# Patient Record
Sex: MALE | Race: WHITE | NOT HISPANIC OR LATINO | URBAN - METROPOLITAN AREA
[De-identification: names, ages, dates, MRNs, and addresses within clinical notes are randomized per-mention and may not be internally consistent; named-entity substitution may affect disease eponyms.]

---
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
## 2018-01-12 NOTE — MISCELLANEOUS
Message  Return to work or school:   Shilpi Starkey is under my professional care   He was seen in my office on 10/04/2017     He is able to return to school on 10/05/2017     Carlos Maier MD       Signatures   Electronically signed by : VIBHA Bowman ; Oct  9 2017 10:40AM EST

## 2018-01-13 VITALS
SYSTOLIC BLOOD PRESSURE: 116 MMHG | HEART RATE: 88 BPM | BODY MASS INDEX: 19.7 KG/M2 | DIASTOLIC BLOOD PRESSURE: 68 MMHG | HEIGHT: 68 IN | WEIGHT: 130 LBS

## 2018-01-14 VITALS
RESPIRATION RATE: 18 BRPM | DIASTOLIC BLOOD PRESSURE: 60 MMHG | HEIGHT: 68 IN | OXYGEN SATURATION: 100 % | BODY MASS INDEX: 18.49 KG/M2 | SYSTOLIC BLOOD PRESSURE: 108 MMHG | WEIGHT: 122 LBS | HEART RATE: 97 BPM | TEMPERATURE: 98.3 F

## 2018-01-15 VITALS
HEIGHT: 68 IN | DIASTOLIC BLOOD PRESSURE: 70 MMHG | HEART RATE: 64 BPM | SYSTOLIC BLOOD PRESSURE: 118 MMHG | BODY MASS INDEX: 18.64 KG/M2 | WEIGHT: 123 LBS

## 2018-01-15 VITALS
HEIGHT: 68 IN | SYSTOLIC BLOOD PRESSURE: 104 MMHG | HEART RATE: 90 BPM | TEMPERATURE: 98.4 F | DIASTOLIC BLOOD PRESSURE: 70 MMHG | OXYGEN SATURATION: 97 % | WEIGHT: 119 LBS | RESPIRATION RATE: 16 BRPM | BODY MASS INDEX: 18.04 KG/M2

## 2018-04-18 ENCOUNTER — OFFICE VISIT (OUTPATIENT)
Dept: FAMILY MEDICINE CLINIC | Facility: CLINIC | Age: 18
End: 2018-04-18
Payer: COMMERCIAL

## 2018-04-18 VITALS
HEIGHT: 67 IN | DIASTOLIC BLOOD PRESSURE: 66 MMHG | BODY MASS INDEX: 19.62 KG/M2 | RESPIRATION RATE: 16 BRPM | TEMPERATURE: 98.2 F | SYSTOLIC BLOOD PRESSURE: 118 MMHG | WEIGHT: 125 LBS

## 2018-04-18 DIAGNOSIS — K40.90 RIGHT INGUINAL HERNIA: ICD-10-CM

## 2018-04-18 DIAGNOSIS — N50.811 TESTICULAR PAIN, RIGHT: Primary | ICD-10-CM

## 2018-04-18 DIAGNOSIS — Q55.22 RETRACTIBLE TESTIS: ICD-10-CM

## 2018-04-18 PROCEDURE — 99213 OFFICE O/P EST LOW 20 MIN: CPT | Performed by: FAMILY MEDICINE

## 2018-04-18 NOTE — PATIENT INSTRUCTIONS
WARM BATHS AND WARM COMPRESSES  ADVIL PRN PAIN  IF PAIN RETURNS AND IS WORSE, GO IMMEDIATELY TO THE ER    UROLOGIC CONSULT

## 2018-04-18 NOTE — PROGRESS NOTES
Assessment/Plan:    Problem List Items Addressed This Visit     Retractible testis    Right inguinal hernia    Testicular pain, right - Primary          Patient Instructions   WARM BATHS AND WARM COMPRESSES  ADVIL PRN PAIN  IF PAIN RETURNS AND IS WORSE, GO IMMEDIATELY TO THE ER    UROLOGIC CONSULT      Return in about 4 weeks (around 5/16/2018), or if symptoms worsen or fail to improve  Subjective:      Patient ID: Isai Khan is a 19 y o  male  Chief Complaint   Patient presents with    Testicle Injury     ascended testicle       PATIENT RETURNS  CONTINUES TO EXPERIENCE TESTICULAR RETRACTION  LAST 2 WEEKS  INCREASED DISCOMFORT ON R SIDE  DENIES ANY NVD  NO HEMATURIA  TESTIS IS ABLE TO BE REDUCED    DISCUSSED UROLOGY VISITS  REVIEWED OPTIONS        The following portions of the patient's history were reviewed and updated as appropriate: allergies, current medications, past family history, past medical history, past social history, past surgical history and problem list     Review of Systems   Constitutional: Negative for chills, fatigue and fever  HENT: Negative for sore throat  Eyes: Negative for discharge  Respiratory: Negative for cough and chest tightness  Cardiovascular: Negative for chest pain and palpitations  Gastrointestinal: Negative for abdominal pain, diarrhea, nausea and vomiting  Genitourinary: Positive for testicular pain  Negative for decreased urine volume, discharge, dysuria, genital sores, penile pain, penile swelling and scrotal swelling  Musculoskeletal: Negative for arthralgias and gait problem  Neurological: Negative for dizziness, weakness and headaches  Hematological: Negative for adenopathy  Psychiatric/Behavioral: The patient is not nervous/anxious  No current outpatient prescriptions on file  No current facility-administered medications for this visit          Objective:    /66 (BP Location: Right arm, Patient Position: Sitting, Cuff Size: Large)   Temp 98 2 °F (36 8 °C) (Temporal)   Resp 16   Ht 5' 7" (1 702 m)   Wt 56 7 kg (125 lb)   BMI 19 58 kg/m²        Physical Exam   Constitutional: He is oriented to person, place, and time  He appears well-developed and well-nourished  HENT:   Head: Normocephalic and atraumatic  Eyes: Conjunctivae and EOM are normal  Pupils are equal, round, and reactive to light  Right eye exhibits no discharge  Left eye exhibits no discharge  Neck: Neck supple  No JVD present  No thyromegaly present  Cardiovascular: Normal rate, regular rhythm and normal heart sounds  No murmur heard  Pulmonary/Chest: Effort normal and breath sounds normal  He has no wheezes  He has no rales  Abdominal: Soft  Bowel sounds are normal  He exhibits no mass  There is no hepatosplenomegaly  There is no tenderness  There is no rebound, no guarding and no CVA tenderness  Genitourinary: Penis normal  No penile tenderness  Genitourinary Comments: R TESTIS RETREACTED  REDUCED BUT MILDLY TENDER  CREMASTERIC REFLEX PRESENT  NO ERYTHEMA   Musculoskeletal: Normal range of motion  He exhibits no edema, tenderness or deformity  Lymphadenopathy:     He has no cervical adenopathy  He has no axillary adenopathy  Neurological: He is alert and oriented to person, place, and time  Skin: Skin is warm and dry  No rash noted  No erythema  Psychiatric: He has a normal mood and affect   His behavior is normal  Judgment and thought content normal               Camila Francisco MD

## 2018-04-23 ENCOUNTER — TELEPHONE (OUTPATIENT)
Dept: UROLOGY | Facility: AMBULATORY SURGERY CENTER | Age: 18
End: 2018-04-23

## 2018-04-23 NOTE — TELEPHONE ENCOUNTER
Received phone call from patient's mother, Feliciakeyur Bradley, requesting an appointment for patient  Per mother, patient has undescended testicle and has been experiencing pain off and on  Currently patient has no pain, but would like to be seen  Per Felicia Bradley, patient would like to see Dr Julieta Workman only  OV scheduled for 4/30/18 with Dr Julieta Workman in [facility]

## 2018-04-26 ENCOUNTER — TELEPHONE (OUTPATIENT)
Dept: FAMILY MEDICINE CLINIC | Facility: CLINIC | Age: 18
End: 2018-04-26

## 2018-04-26 NOTE — TELEPHONE ENCOUNTER
URI CALLED AND SAID THAT MIKCIE IS GOING TO SEE DR ELISE POWELL AT [facility] ON Monday THE 30TH   HIS NUMBER IS [id number]

## 2018-04-30 ENCOUNTER — OFFICE VISIT (OUTPATIENT)
Dept: UROLOGY | Facility: AMBULATORY SURGERY CENTER | Age: 18
End: 2018-04-30
Payer: COMMERCIAL

## 2018-04-30 VITALS — BODY MASS INDEX: 19.15 KG/M2 | WEIGHT: 122 LBS | HEIGHT: 67 IN

## 2018-04-30 DIAGNOSIS — Q55.22 RETRACTIBLE TESTIS: Primary | ICD-10-CM

## 2018-04-30 PROCEDURE — 99214 OFFICE O/P EST MOD 30 MIN: CPT | Performed by: UROLOGY

## 2018-04-30 NOTE — LETTER
April 30, 2018     Anastacio Schmidt MD  [address] [address]    Patient: Landen Moses   YOB: 2000   Date of Visit: 4/30/2018       Dear Dr Morro Rodriguez: Thank you for referring Landen Moses to me for evaluation  Below are my notes for this consultation  If you have questions, please do not hesitate to call me  I look forward to following your patient along with you  Sincerely,        Bud Vázquez MD        CC: DO Bud Rodríguez MD  4/30/2018  1:14 PM  Sign at close encounter  4/30/2018    Landen Moses  2000  [id number]        Assessment  Retractile right testis with chronic pain, symptoms are worse and persistent    Plan  We discussed his symptoms  Options include management with oral analgesics, support, and rest   This has not worked for him and he is not interested in being on long-term analgesics  We also discussed the possibility of attempted release of the cord, although this is less likely to be a long-term solution for him  Additionally discussed orchiectomy  Patient is interested in this and this is his request   We discussed that although his testis is atrophic, it is likely somewhat functional and there may be loss of testosterone production and semen production  He is comfortable with this risk  He is very frustrated would like the testicle removed  He is also concerned about possible hernia  I will refer him for general surgery evaluation prior to proceeding with orchiectomy and if hernia is deemed to be present, he may have repair done at the same time  Total visit time was 25 minutes of which over 50% was spent on counseling  History of Present Illness  Joe is a 19 y o  male seen in follow-up for undescended/retractile right testis  He reports worsening pain, about 5/10 which is relatively persistent  Laying down helps  This affects him at work and he has had call out of work on occasions due to the pain  Previous ultrasound did not reveal any sign of malignancy  He had tumor markers last year which were normal as well  AUA SYMPTOM SCORE      Most Recent Value   AUA SYMPTOM SCORE   How often have you had a sensation of not emptying your bladder completely after you finished urinating? 1   How often have you had to urinate again less than two hours after you finished urinating? 1   How often have you found you stopped and started again several times when you urinate?  0   How often have you found it difficult to postpone urination? 0   How often have you had a weak urinary stream?  0   How often have you had to push or strain to begin urination? 0   How many times did you most typically get up to urinate from the time you went to bed at night until the time you got up in the morning? 1   Quality of Life: If you were to spend the rest of your life with your urinary condition just the way it is now, how would you feel about that?  5   AUA SYMPTOM SCORE  3          Review of Systems  Review of Systems   Constitutional: Negative  HENT: Negative  Respiratory: Negative  Cardiovascular: Negative  Gastrointestinal: Negative  Genitourinary:        As per HPI   Musculoskeletal: Negative  Skin: Negative  Neurological: Negative  Hematological: Negative  Past Medical History  Past Medical History:   Diagnosis Date    Alopecia     Contact dermatitis        Past Social History  Past Surgical History:   Procedure Laterality Date    WISDOM TOOTH EXTRACTION         Past Family History  Family History   Problem Relation Age of Onset    Diabetes Mother     Hypertension Mother     Substance Abuse Neg Hx     Mental illness Neg Hx        Past Social history  Social History     Social History    Marital status: Single     Spouse name: N/A    Number of children: N/A    Years of education: N/A     Occupational History    Not on file       Social History Main Topics    Smoking status: Never Smoker    Smokeless tobacco: Never Used    Alcohol use No    Drug use: No    Sexual activity: Not on file     Other Topics Concern    Not on file     Social History Narrative    Caffeine use- cola         History   Smoking Status    Never Smoker   Smokeless Tobacco    Never Used       Current Medications  No current outpatient prescriptions on file  No current facility-administered medications for this visit  Allergies  No Known Allergies    Past Medical History, Social History, Family History, medications and allergies were reviewed  Vitals  Vitals:    04/30/18 1245   Weight: 55 3 kg (122 lb)   Height: 5' 7" (1 702 m)       Physical Exam  Physical Exam   Constitutional: He is oriented to person, place, and time  He appears well-developed and well-nourished  Cardiovascular: Normal rate  Pulmonary/Chest: Effort normal    Abdominal: Soft  Genitourinary: Penis normal    Genitourinary Comments: Right testis atrophic, in the inguinal canal, can be brought down but is very tender  No mass  Left testis normally descended, normal size and consistency  Musculoskeletal: Normal range of motion  Neurological: He is alert and oriented to person, place, and time  Skin: Skin is warm, dry and intact  Psychiatric: He has a normal mood and affect  Vitals reviewed          Results  No results found for: PSA  No results found for: GLUCOSE, CALCIUM, NA, K, CO2, CL, BUN, CREATININE  No results found for: WBC, HGB, HCT, MCV, PLT

## 2018-04-30 NOTE — PROGRESS NOTES
4/30/2018    Chapo Gan  2000  [id number]        Assessment  Retractile right testis with chronic pain, symptoms are worse and persistent    Plan  We discussed his symptoms  Options include management with oral analgesics, support, and rest   This has not worked for him and he is not interested in being on long-term analgesics  We also discussed the possibility of attempted release of the cord, although this is less likely to be a long-term solution for him  Additionally discussed orchiectomy  Patient is interested in this and this is his request   We discussed that although his testis is atrophic, it is likely somewhat functional and there may be loss of testosterone production and semen production  He is comfortable with this risk  He is very frustrated would like the testicle removed  He is also concerned about possible hernia  I will refer him for general surgery evaluation prior to proceeding with orchiectomy and if hernia is deemed to be present, he may have repair done at the same time  Total visit time was 25 minutes of which over 50% was spent on counseling  History of Present Illness  Fredi Mcfarland is a 19 y o  male seen in follow-up for undescended/retractile right testis  He reports worsening pain, about 5/10 which is relatively persistent  Laying down helps  This affects him at work and he has had call out of work on occasions due to the pain  Previous ultrasound did not reveal any sign of malignancy  He had tumor markers last year which were normal as well  AUA SYMPTOM SCORE      Most Recent Value   AUA SYMPTOM SCORE   How often have you had a sensation of not emptying your bladder completely after you finished urinating? 1   How often have you had to urinate again less than two hours after you finished urinating? 1   How often have you found you stopped and started again several times when you urinate?  0   How often have you found it difficult to postpone urination? 0   How often have you had a weak urinary stream?  0   How often have you had to push or strain to begin urination? 0   How many times did you most typically get up to urinate from the time you went to bed at night until the time you got up in the morning? 1   Quality of Life: If you were to spend the rest of your life with your urinary condition just the way it is now, how would you feel about that?  5   AUA SYMPTOM SCORE  3          Review of Systems  Review of Systems   Constitutional: Negative  HENT: Negative  Respiratory: Negative  Cardiovascular: Negative  Gastrointestinal: Negative  Genitourinary:        As per HPI   Musculoskeletal: Negative  Skin: Negative  Neurological: Negative  Hematological: Negative  Past Medical History  Past Medical History:   Diagnosis Date    Alopecia     Contact dermatitis        Past Social History  Past Surgical History:   Procedure Laterality Date    WISDOM TOOTH EXTRACTION         Past Family History  Family History   Problem Relation Age of Onset    Diabetes Mother     Hypertension Mother     Substance Abuse Neg Hx     Mental illness Neg Hx        Past Social history  Social History     Social History    Marital status: Single     Spouse name: N/A    Number of children: N/A    Years of education: N/A     Occupational History    Not on file  Social History Main Topics    Smoking status: Never Smoker    Smokeless tobacco: Never Used    Alcohol use No    Drug use: No    Sexual activity: Not on file     Other Topics Concern    Not on file     Social History Narrative    Caffeine use- cola         History   Smoking Status    Never Smoker   Smokeless Tobacco    Never Used       Current Medications  No current outpatient prescriptions on file  No current facility-administered medications for this visit          Allergies  No Known Allergies    Past Medical History, Social History, Family History, medications and allergies were reviewed  Vitals  Vitals:    04/30/18 1245   Weight: 55 3 kg (122 lb)   Height: 5' 7" (1 702 m)       Physical Exam  Physical Exam   Constitutional: He is oriented to person, place, and time  He appears well-developed and well-nourished  Cardiovascular: Normal rate  Pulmonary/Chest: Effort normal    Abdominal: Soft  Genitourinary: Penis normal    Genitourinary Comments: Right testis atrophic, in the inguinal canal, can be brought down but is very tender  No mass  Left testis normally descended, normal size and consistency  Musculoskeletal: Normal range of motion  Neurological: He is alert and oriented to person, place, and time  Skin: Skin is warm, dry and intact  Psychiatric: He has a normal mood and affect  Vitals reviewed          Results  No results found for: PSA  No results found for: GLUCOSE, CALCIUM, NA, K, CO2, CL, BUN, CREATININE  No results found for: WBC, HGB, HCT, MCV, PLT

## 2018-08-24 ENCOUNTER — OFFICE VISIT (OUTPATIENT)
Dept: FAMILY MEDICINE CLINIC | Facility: CLINIC | Age: 18
End: 2018-08-24
Payer: COMMERCIAL

## 2018-08-24 VITALS
HEIGHT: 67 IN | WEIGHT: 123.2 LBS | DIASTOLIC BLOOD PRESSURE: 54 MMHG | TEMPERATURE: 98.7 F | BODY MASS INDEX: 19.34 KG/M2 | HEART RATE: 53 BPM | RESPIRATION RATE: 16 BRPM | OXYGEN SATURATION: 99 % | SYSTOLIC BLOOD PRESSURE: 100 MMHG

## 2018-08-24 DIAGNOSIS — R51.9 LEFT FACIAL PAIN: Primary | ICD-10-CM

## 2018-08-24 DIAGNOSIS — K11.20 PAROTIDITIS: ICD-10-CM

## 2018-08-24 PROCEDURE — 3008F BODY MASS INDEX DOCD: CPT | Performed by: FAMILY MEDICINE

## 2018-08-24 PROCEDURE — 99213 OFFICE O/P EST LOW 20 MIN: CPT | Performed by: FAMILY MEDICINE

## 2018-08-24 NOTE — PROGRESS NOTES
Assessment/Plan:    Problem List Items Addressed This Visit        Digestive    Parotiditis       Other    Left facial pain - Primary          Patient Instructions   WARM COMPRESS TO AREA BID  ADVIL AS NEEDED  TRIAL OF PREDNISONE    IF SYMPTOMS PERSIST OR WORSEN - RTO      Return in about 1 week (around 8/31/2018), or if symptoms worsen or fail to improve  Subjective:      Patient ID: Shannon Alcantara is a 19 y o  male  Chief Complaint   Patient presents with    Jaw Pain       PATIENT COMPLAINS OF L FACIAL PAIN ON L SIDE  OFF AND ON  MUCH BETTER TODAY  NO HX OF TRAUMA  DENIES ANY RECENT ILLNESS  NO RASH          The following portions of the patient's history were reviewed and updated as appropriate: allergies, current medications, past family history, past medical history, past social history, past surgical history and problem list     Review of Systems   Constitutional: Negative for chills, fatigue and fever  HENT: Negative for sore throat  Eyes: Negative for discharge  Respiratory: Negative for cough and chest tightness  Cardiovascular: Negative for chest pain and palpitations  Gastrointestinal: Negative for abdominal pain, diarrhea, nausea and vomiting  Musculoskeletal: Positive for arthralgias  Negative for gait problem  Neurological: Negative for dizziness, weakness and headaches  Hematological: Negative for adenopathy  Psychiatric/Behavioral: The patient is not nervous/anxious  No current outpatient prescriptions on file  No current facility-administered medications for this visit  Objective:    /54 (BP Location: Left arm, Patient Position: Sitting, Cuff Size: Standard)   Pulse (!) 53   Temp 98 7 °F (37 1 °C) (Temporal)   Resp 16   Ht 5' 6 5" (1 689 m)   Wt 55 9 kg (123 lb 3 2 oz)   SpO2 99%   BMI 19 59 kg/m²        Physical Exam   Constitutional: He is oriented to person, place, and time  He appears well-developed and well-nourished     HENT:   Head: Normocephalic and atraumatic  L TM NL  NO SIGNIFICANT TMJ TENDERNESS    MILD PAROTID TENDERNESS  NO SWELLING   Eyes: Conjunctivae and EOM are normal  Pupils are equal, round, and reactive to light  Right eye exhibits no discharge  Left eye exhibits no discharge  Neck: Neck supple  No JVD present  No thyromegaly present  Cardiovascular: Normal rate, regular rhythm and normal heart sounds  No murmur heard  Pulmonary/Chest: Effort normal and breath sounds normal  He has no wheezes  He has no rales  Abdominal: Soft  Bowel sounds are normal  He exhibits no mass  There is no hepatosplenomegaly  There is no tenderness  There is no rebound, no guarding and no CVA tenderness  Musculoskeletal: Normal range of motion  He exhibits no edema, tenderness or deformity  Lymphadenopathy:     He has no cervical adenopathy  He has no axillary adenopathy  Neurological: He is alert and oriented to person, place, and time  Skin: Skin is warm and dry  No rash noted  No erythema  Psychiatric: He has a normal mood and affect   His behavior is normal  Judgment and thought content normal               Kaye Mejias MD

## 2018-08-24 NOTE — PATIENT INSTRUCTIONS
WARM COMPRESS TO AREA BID  ADVIL AS NEEDED  TRIAL OF PREDNISONE    IF SYMPTOMS PERSIST OR WORSEN - RTO

## 2019-06-25 ENCOUNTER — OFFICE VISIT (OUTPATIENT)
Dept: URGENT CARE | Facility: CLINIC | Age: 19
End: 2019-06-25
Payer: COMMERCIAL

## 2019-06-25 VITALS
OXYGEN SATURATION: 100 % | BODY MASS INDEX: 20.13 KG/M2 | HEART RATE: 78 BPM | TEMPERATURE: 99.4 F | RESPIRATION RATE: 16 BRPM | SYSTOLIC BLOOD PRESSURE: 104 MMHG | DIASTOLIC BLOOD PRESSURE: 64 MMHG | HEIGHT: 68 IN | WEIGHT: 132.8 LBS

## 2019-06-25 DIAGNOSIS — J06.9 ACUTE URI: Primary | ICD-10-CM

## 2019-06-25 PROCEDURE — 99213 OFFICE O/P EST LOW 20 MIN: CPT | Performed by: PHYSICIAN ASSISTANT

## 2019-06-25 RX ORDER — BENZONATATE 100 MG/1
100 CAPSULE ORAL 3 TIMES DAILY PRN
Qty: 30 CAPSULE | Refills: 0 | Status: SHIPPED | OUTPATIENT
Start: 2019-06-25 | End: 2022-04-28 | Stop reason: ALTCHOICE

## 2019-06-25 RX ORDER — FLUTICASONE PROPIONATE 50 MCG
1 SPRAY, SUSPENSION (ML) NASAL DAILY
Qty: 1 BOTTLE | Refills: 0 | Status: SHIPPED | OUTPATIENT
Start: 2019-06-25 | End: 2022-04-28 | Stop reason: ALTCHOICE

## 2021-04-13 ENCOUNTER — TELEPHONE (OUTPATIENT)
Dept: FAMILY MEDICINE CLINIC | Facility: CLINIC | Age: 21
End: 2021-04-13

## 2022-04-28 ENCOUNTER — OFFICE VISIT (OUTPATIENT)
Dept: FAMILY MEDICINE CLINIC | Facility: CLINIC | Age: 22
End: 2022-04-28
Payer: COMMERCIAL

## 2022-04-28 VITALS
OXYGEN SATURATION: 98 % | BODY MASS INDEX: 20.76 KG/M2 | DIASTOLIC BLOOD PRESSURE: 78 MMHG | HEIGHT: 68 IN | HEART RATE: 92 BPM | TEMPERATURE: 97.4 F | WEIGHT: 137 LBS | SYSTOLIC BLOOD PRESSURE: 110 MMHG | RESPIRATION RATE: 12 BRPM

## 2022-04-28 DIAGNOSIS — Z13.1 SCREENING FOR DIABETES MELLITUS: ICD-10-CM

## 2022-04-28 DIAGNOSIS — Z11.4 SCREENING FOR HIV (HUMAN IMMUNODEFICIENCY VIRUS): ICD-10-CM

## 2022-04-28 DIAGNOSIS — M26.622 ARTHRALGIA OF LEFT TEMPOROMANDIBULAR JOINT: Primary | ICD-10-CM

## 2022-04-28 DIAGNOSIS — Z11.59 NEED FOR HEPATITIS C SCREENING TEST: ICD-10-CM

## 2022-04-28 DIAGNOSIS — Z13.0 SCREENING FOR DEFICIENCY ANEMIA: ICD-10-CM

## 2022-04-28 DIAGNOSIS — R51.9 LEFT FACIAL PAIN: ICD-10-CM

## 2022-04-28 DIAGNOSIS — Z00.00 PREVENTATIVE HEALTH CARE: ICD-10-CM

## 2022-04-28 DIAGNOSIS — Z13.29 SCREENING FOR THYROID DISORDER: ICD-10-CM

## 2022-04-28 DIAGNOSIS — Z13.220 SCREENING FOR LIPID DISORDERS: ICD-10-CM

## 2022-04-28 PROBLEM — J06.9 ACUTE URI: Status: RESOLVED | Noted: 2019-06-25 | Resolved: 2022-04-28

## 2022-04-28 PROBLEM — K11.20 PAROTIDITIS: Status: RESOLVED | Noted: 2018-08-24 | Resolved: 2022-04-28

## 2022-04-28 PROBLEM — N50.811 TESTICULAR PAIN, RIGHT: Status: RESOLVED | Noted: 2018-04-18 | Resolved: 2022-04-28

## 2022-04-28 PROCEDURE — 99213 OFFICE O/P EST LOW 20 MIN: CPT | Performed by: NURSE PRACTITIONER

## 2022-04-28 PROCEDURE — 3008F BODY MASS INDEX DOCD: CPT | Performed by: NURSE PRACTITIONER

## 2022-04-28 RX ORDER — NAPROXEN 500 MG/1
500 TABLET ORAL 2 TIMES DAILY WITH MEALS
Qty: 20 TABLET | Refills: 0 | Status: SHIPPED | OUTPATIENT
Start: 2022-04-28 | End: 2022-05-08

## 2022-04-28 NOTE — PROGRESS NOTES
Assessment/Plan:    1  Arthralgia of left temporomandibular joint  -     naproxen (Naprosyn) 500 mg tablet; Take 1 tablet (500 mg total) by mouth 2 (two) times a day with meals for 10 days    2  Left facial pain    3  Need for hepatitis C screening test  -     Hepatitis C Ab W/Refl To HCV RNA, Qn, PCR ([facility]); Future  -     Hepatitis C Ab W/Refl To HCV RNA, Qn, PCR ([facility])    4  Screening for HIV (human immunodeficiency virus)  -     [facility]- Human Immunodeficiency Virus 1/2 Antigen / Antibody ( Fourth Generation) with Reflex Testing; Future    5  Preventative health care  -     CBC and differential; Future  -     Comprehensive metabolic panel; Future  -     TSH, 3rd generation; Future  -     Lipid panel; Future  -     Hepatitis C Ab W/Refl To HCV RNA, Qn, PCR ([facility]); Future  -     [facility]- Human Immunodeficiency Virus 1/2 Antigen / Antibody ( Fourth Generation) with Reflex Testing; Future  -     CBC and differential  -     Comprehensive metabolic panel  -     TSH, 3rd generation  -     Lipid panel  -     Hepatitis C Ab W/Refl To HCV RNA, Qn, PCR ([facility])    6  Screening for deficiency anemia  -     CBC and differential; Future  -     CBC and differential    7  Screening for diabetes mellitus  -     Comprehensive metabolic panel; Future  -     Comprehensive metabolic panel    8  Screening for thyroid disorder  -     TSH, 3rd generation; Future  -     TSH, 3rd generation    9  Screening for lipid disorders  -     Lipid panel; Future  -     Lipid panel            Return in about 10 days (around 5/8/2022), or if symptoms worsen or fail to improve, for Annual physical     Subjective:      Patient ID: Brittney James is a 23 y o  male      Chief Complaint   Patient presents with    Jaw Pain     pt here for jaw pain on the left side, pain started 5 days, sometimes it is worse than others       Joe is a 23year old male who presents to the office for evaluation and management of left sided facial pain  Reports that his symptoms have been chronic and intermittent for years  States they have worsened over the past week or so  States that he does grind his teeth at night  Denies dental issues  Denies sore throat or dry mouth  The following portions of the patient's history were reviewed and updated as appropriate: allergies, current medications, past family history, past medical history, past social history, past surgical history and problem list     Review of Systems   Constitutional: Negative for chills, fatigue and fever  HENT: Positive for ear pain (pain in front of left ear)  Negative for dental problem and facial swelling  Left sided facial pain with inability to fully open jaw   Respiratory: Negative for cough and shortness of breath  Cardiovascular: Negative for chest pain  Current Outpatient Medications   Medication Sig Dispense Refill    naproxen (Naprosyn) 500 mg tablet Take 1 tablet (500 mg total) by mouth 2 (two) times a day with meals for 10 days 20 tablet 0     No current facility-administered medications for this visit  Objective:    /78   Pulse 92   Temp (!) 97 4 °F (36 3 °C) (Temporal)   Resp 12   Ht 5' 8" (1 727 m)   Wt 62 1 kg (137 lb)   SpO2 98%   BMI 20 83 kg/m²        Physical Exam  Vitals reviewed  Constitutional:       General: He is not in acute distress  Appearance: He is well-developed  He is not diaphoretic  HENT:      Head: Normocephalic and atraumatic  Jaw: Trismus, tenderness and pain on movement present  No swelling  Right Ear: Tympanic membrane, ear canal and external ear normal       Left Ear: Tympanic membrane, ear canal and external ear normal       Mouth/Throat:      Mouth: Mucous membranes are moist       Dentition: Normal dentition  No dental abscesses  Pharynx: Oropharynx is clear  No pharyngeal swelling or posterior oropharyngeal erythema     Eyes:      General: Right eye: No discharge  Left eye: No discharge  Conjunctiva/sclera: Conjunctivae normal    Neck:      Thyroid: No thyromegaly  Cardiovascular:      Rate and Rhythm: Normal rate and regular rhythm  Heart sounds: Normal heart sounds  Pulmonary:      Effort: Pulmonary effort is normal  No respiratory distress  Breath sounds: Normal breath sounds  No decreased breath sounds, wheezing, rhonchi or rales  Musculoskeletal:      Cervical back: Normal range of motion and neck supple  Lymphadenopathy:      Cervical: No cervical adenopathy  Skin:     General: Skin is warm and dry  Findings: No rash  Neurological:      Mental Status: He is alert and oriented to person, place, and time  Psychiatric:         Behavior: Behavior normal          Thought Content:  Thought content normal          Judgment: Judgment normal                 Herbie Gibbons, LUNANP

## 2022-11-16 ENCOUNTER — OFFICE VISIT (OUTPATIENT)
Dept: FAMILY MEDICINE CLINIC | Facility: CLINIC | Age: 22
End: 2022-11-16

## 2022-11-16 VITALS
RESPIRATION RATE: 12 BRPM | DIASTOLIC BLOOD PRESSURE: 70 MMHG | BODY MASS INDEX: 19.85 KG/M2 | OXYGEN SATURATION: 97 % | HEIGHT: 68 IN | SYSTOLIC BLOOD PRESSURE: 108 MMHG | WEIGHT: 131 LBS | HEART RATE: 72 BPM | TEMPERATURE: 98 F

## 2022-11-16 DIAGNOSIS — K29.00 ACUTE SUPERFICIAL GASTRITIS WITHOUT HEMORRHAGE: Primary | ICD-10-CM

## 2022-11-16 DIAGNOSIS — R10.13 DYSPEPSIA: ICD-10-CM

## 2022-11-16 RX ORDER — SUCRALFATE 1 G/1
1 TABLET ORAL 4 TIMES DAILY
Qty: 40 TABLET | Refills: 0 | Status: SHIPPED | OUTPATIENT
Start: 2022-11-16

## 2022-11-16 NOTE — PROGRESS NOTES
Name: Alexandro Gay      : 2000      MRN: [id number]  Encounter Provider: Maggie Hernandez MD  Encounter Date: 2022   Encounter department: [address] [place] [address]     1  Acute superficial gastritis without hemorrhage  -     sucralfate (CARAFATE) 1 g tablet; Take 1 tablet (1 g total) by mouth 4 (four) times a day    2  Dyspepsia  -     sucralfate (CARAFATE) 1 g tablet; Take 1 tablet (1 g total) by mouth 4 (four) times a day      Depression Screening and Follow-up Plan: Patient was screened for depression during today's encounter  They screened negative with a PHQ-2 score of 0  Subjective      Heartburn  He complains of abdominal pain, chest pain, globus sensation, heartburn, nausea and tooth decay  He reports no belching, no choking, no coughing, no dysphagia, no early satiety, no hoarse voice, no sore throat, no stridor, no water brash or no wheezing  This is a new problem  The current episode started in the past 7 days  The problem occurs constantly  The problem has been waxing and waning  The heartburn duration is several minutes  The heartburn is located in the abdomen and substernum  The heartburn is of mild intensity  The heartburn does not wake him from sleep  The heartburn does not limit his activity  The heartburn doesn't change with position  Pertinent negatives include no fatigue, melena or weight loss  He has tried a PPI for the symptoms  The treatment provided no relief  Review of Systems   Constitutional: Negative for chills, fatigue, fever and weight loss  HENT: Negative for hoarse voice and sore throat  Eyes: Negative for discharge  Respiratory: Negative for cough, choking, chest tightness and wheezing  Cardiovascular: Positive for chest pain  Negative for palpitations  Gastrointestinal: Positive for abdominal pain, heartburn and nausea  Negative for diarrhea, dysphagia, melena and vomiting     Musculoskeletal: Negative for arthralgias and gait problem  Neurological: Negative for dizziness, weakness and headaches  Hematological: Negative for adenopathy  Psychiatric/Behavioral: The patient is not nervous/anxious  Current Outpatient Medications on File Prior to Visit   Medication Sig   • naproxen (Naprosyn) 500 mg tablet Take 1 tablet (500 mg total) by mouth 2 (two) times a day with meals for 10 days       Objective     /70   Pulse 72   Temp 98 °F (36 7 °C) (Temporal)   Resp 12   Ht 5' 8" (1 727 m)   Wt 59 4 kg (131 lb)   SpO2 97%   BMI 19 92 kg/m²     Physical Exam  Constitutional:       Appearance: He is well-developed and well-nourished  HENT:      Head: Normocephalic and atraumatic  Eyes:      General:         Right eye: No discharge  Left eye: No discharge  Extraocular Movements: EOM normal       Conjunctiva/sclera: Conjunctivae normal       Pupils: Pupils are equal, round, and reactive to light  Neck:      Thyroid: No thyromegaly  Vascular: No JVD  Cardiovascular:      Rate and Rhythm: Normal rate and regular rhythm  Heart sounds: Normal heart sounds  No murmur heard  Pulmonary:      Effort: Pulmonary effort is normal       Breath sounds: Normal breath sounds  No wheezing or rales  Abdominal:      General: Bowel sounds are normal  There is no distension  Palpations: Abdomen is soft  There is no hepatosplenomegaly or mass  Tenderness: There is abdominal tenderness  There is no CVA tenderness, right CVA tenderness, left CVA tenderness, guarding or rebound  Comments: SOFT  BS PRESENT  NO HSM  NO MASSES  MILD EPIGASTRIC TENDERNESS   Musculoskeletal:         General: No tenderness, deformity or edema  Normal range of motion  Cervical back: Neck supple  Lymphadenopathy:      Cervical: No cervical adenopathy  Upper Body:   No axillary adenopathy present  Skin:     General: Skin is warm and dry  Findings: No erythema or rash     Neurological: Mental Status: He is alert and oriented to person, place, and time  Psychiatric:         Mood and Affect: Mood and affect normal          Behavior: Behavior normal          Thought Content:  Thought content normal          Judgment: Judgment normal        Perfecto Walton MD

## 2022-11-16 NOTE — PATIENT INSTRUCTIONS
PLENTY OF FLUIDS  AVOID CAFFEINE AND ALCOHOL    INCREASE OMEPRAZOLE  TO 2 CAPSULES DAILY  TRIAL OF CARAFATE    CALL ON Monday WITH UPDATE, SOONER IF WORSE

## 2023-01-10 ENCOUNTER — OFFICE VISIT (OUTPATIENT)
Dept: FAMILY MEDICINE CLINIC | Facility: CLINIC | Age: 23
End: 2023-01-10

## 2023-01-10 VITALS
SYSTOLIC BLOOD PRESSURE: 100 MMHG | RESPIRATION RATE: 12 BRPM | HEIGHT: 68 IN | DIASTOLIC BLOOD PRESSURE: 70 MMHG | TEMPERATURE: 98.1 F | OXYGEN SATURATION: 97 % | BODY MASS INDEX: 19.1 KG/M2 | HEART RATE: 83 BPM | WEIGHT: 126 LBS

## 2023-01-10 DIAGNOSIS — Q55.22 RETRACTIBLE TESTIS: ICD-10-CM

## 2023-01-10 DIAGNOSIS — R07.9 CHEST PAIN, UNSPECIFIED TYPE: Primary | ICD-10-CM

## 2023-01-10 DIAGNOSIS — N50.812 LEFT TESTICULAR PAIN: ICD-10-CM

## 2023-01-10 NOTE — PROGRESS NOTES
Assessment/Plan:    1  Chest pain, unspecified type  Comments:  Suspect MSK in origin  We did discuss possibly anxiety symptoms/panic attacks  Monitor for improvement with use of NSAIDS BID x's 5 days  Take with food  Orders:  -     POCT ECG    2  Left testicular pain  Assessment & Plan:  Pt is scheduled to consult with urology in about 2 weeks  Advise US evaluation  Orders:  -     US scrotum and testicles; Future; Expected date: 01/10/2023    3  Retractible testis          Return if symptoms worsen or fail to improve  Subjective:      Patient ID: Roland Gordon is a 23 y o  male  Chief Complaint   Patient presents with   • [place] is a 23year old male who presents to the office for evaluation and management of chest pain  Reports that he experiences cehst pain on both sides of his upper chest and occasional numbness in the deltoid area of his left arm  Pt reports that the pain is of random occurrence and resolves spontaneously, "when I stop and think and relax"  Pt reports some anxiety but does not associate the occurrence of pain with feeling anxious  Pt does report left sided testicular pain and reports that when he gets this pain he does get anxious  The following portions of the patient's history were reviewed and updated as appropriate: allergies, current medications, past family history, past medical history, past social history, past surgical history and problem list     Review of Systems   Respiratory: Negative for cough, chest tightness and shortness of breath  Cardiovascular: Positive for chest pain  Negative for palpitations and leg swelling  Genitourinary: Positive for testicular pain  Negative for dysuria and scrotal swelling  Psychiatric/Behavioral: The patient is nervous/anxious            Current Outpatient Medications   Medication Sig Dispense Refill   • naproxen (Naprosyn) 500 mg tablet Take 1 tablet (500 mg total) by mouth 2 (two) times a day with meals for 10 days 20 tablet 0   • sucralfate (CARAFATE) 1 g tablet Take 1 tablet (1 g total) by mouth 4 (four) times a day (Patient not taking: Reported on 1/10/2023) 40 tablet 0     No current facility-administered medications for this visit  Objective:    /70   Pulse 83   Temp 98 1 °F (36 7 °C) (Temporal)   Resp 12   Ht 5' 8" (1 727 m)   Wt 57 2 kg (126 lb)   SpO2 97%   BMI 19 16 kg/m²        Physical Exam  Vitals reviewed  Constitutional:       General: He is not in acute distress  Appearance: He is well-developed  He is not diaphoretic  HENT:      Head: Normocephalic and atraumatic  Eyes:      General:         Right eye: No discharge  Left eye: No discharge  Conjunctiva/sclera: Conjunctivae normal    Neck:      Thyroid: No thyromegaly  Cardiovascular:      Rate and Rhythm: Normal rate and regular rhythm  Pulses: Normal pulses  Heart sounds: Normal heart sounds, S1 normal and S2 normal    Pulmonary:      Effort: Pulmonary effort is normal  No respiratory distress  Breath sounds: Normal breath sounds  No decreased breath sounds, wheezing, rhonchi or rales  Chest:       Musculoskeletal:      Cervical back: Normal range of motion and neck supple  Right lower leg: No edema  Left lower leg: No edema  Lymphadenopathy:      Cervical: No cervical adenopathy  Skin:     General: Skin is warm and dry  Findings: No rash  Neurological:      Mental Status: He is alert and oriented to person, place, and time  Psychiatric:         Behavior: Behavior normal          Thought Content:  Thought content normal          Judgment: Judgment normal                 HANNAH Leal

## 2023-01-13 ENCOUNTER — HOSPITAL ENCOUNTER (OUTPATIENT)
Dept: RADIOLOGY | Facility: HOSPITAL | Age: 23
Discharge: HOME | End: 2023-01-13

## 2023-01-13 DIAGNOSIS — N50.812 LEFT TESTICULAR PAIN: ICD-10-CM

## 2023-01-23 ENCOUNTER — TELEMEDICINE (OUTPATIENT)
Dept: FAMILY MEDICINE CLINIC | Facility: CLINIC | Age: 23
End: 2023-01-23

## 2023-01-23 VITALS — HEIGHT: 68 IN | BODY MASS INDEX: 19.1 KG/M2 | WEIGHT: 126 LBS

## 2023-01-23 DIAGNOSIS — N50.89 TESTICULAR MICROLITHIASIS: Primary | ICD-10-CM

## 2023-01-23 DIAGNOSIS — N50.812 LEFT TESTICULAR PAIN: ICD-10-CM

## 2023-01-23 RX ORDER — PREDNISONE 50 MG/1
50 TABLET ORAL DAILY
COMMUNITY
Start: 2023-01-22

## 2023-01-23 RX ORDER — AZITHROMYCIN 250 MG/1
TABLET, FILM COATED ORAL
COMMUNITY
Start: 2023-01-22

## 2023-01-23 NOTE — PROGRESS NOTES
Virtual Regular Visit    Verification of patient location:    Patient is located in the following state in which I hold an active license [place]      Assessment/Plan:    Problem List Items Addressed This Visit        Other    Left testicular pain    Testicular microlithiasis - Primary     Reviewed US with patient  Recommend monthly testicular exams  No further action recommended at this time  Pt is scheduled to see Dr Shikha Sanford, urology in 3 days  Reason for visit is   Chief Complaint   Patient presents with   • Follow-up     Pt here to review US results   • Virtual Regular Visit        Encounter provider HANNAH Adams    Provider located at [address] [place] [address]  [id number]      Recent Visits  No visits were found meeting these conditions  Showing recent visits within past 7 days and meeting all other requirements  Today's Visits  Date Type Provider Dept   01/23/23 Telemedicine Bernadette Bowen [facility]   Showing today's visits and meeting all other requirements  Future Appointments  No visits were found meeting these conditions  Showing future appointments within next 150 days and meeting all other requirements       The patient was identified by name and date of birth  Reeda Heimlich was informed that this is a telemedicine visit and that the visit is being conducted through the [address] [facility] platform  He agrees to proceed     My office door was closed  No one else was in the room  He acknowledged consent and understanding of privacy and security of the video platform  The patient has agreed to participate and understands they can discontinue the visit at any time  Patient is aware this is a billable service  Subjective  Reeda Heimlich is a 23 y o  male who is scheduled for a virtual visit to discuss recent scrotal US for testicular pain           Past Medical History:   Diagnosis Date   • Alopecia    • Contact dermatitis        Past Surgical History:   Procedure Laterality Date   • WISDOM TOOTH EXTRACTION         Current Outpatient Medications   Medication Sig Dispense Refill   • azithromycin (ZITHROMAX) 250 mg tablet TAKE 2 TABLETS BY MOUTH ON DAY 1, AND THEN TAKE 1 TABLET BY MOUTH ONCE A DAY ON DAY 2 THROUGH DAY 5     • predniSONE 50 mg tablet Take 50 mg by mouth daily       No current facility-administered medications for this visit  No Known Allergies    Review of Systems   Constitutional: Negative for chills, fatigue and fever  Genitourinary: Positive for testicular pain  Video Exam    Vitals:    01/23/23 1320   Weight: 57 2 kg (126 lb)   Height: 5' 8" (1 727 m)       Physical Exam  Vitals reviewed  Constitutional:       Appearance: Normal appearance  HENT:      Head: Normocephalic and atraumatic  Neurological:      Mental Status: He is alert and oriented to person, place, and time     Psychiatric:         Mood and Affect: Mood normal           I spent 15 minutes directly with the patient during this visit

## 2023-01-23 NOTE — ASSESSMENT & PLAN NOTE
Reviewed US with patient  Recommend monthly testicular exams  No further action recommended at this time  Pt is scheduled to see Dr Whit Mariee, urology in 3 days

## 2023-05-12 ENCOUNTER — OFFICE VISIT (OUTPATIENT)
Dept: FAMILY MEDICINE CLINIC | Facility: CLINIC | Age: 23
End: 2023-05-12

## 2023-05-12 VITALS
HEART RATE: 92 BPM | TEMPERATURE: 97.6 F | DIASTOLIC BLOOD PRESSURE: 64 MMHG | BODY MASS INDEX: 19.1 KG/M2 | SYSTOLIC BLOOD PRESSURE: 102 MMHG | HEIGHT: 68 IN | RESPIRATION RATE: 16 BRPM | OXYGEN SATURATION: 98 % | WEIGHT: 126 LBS

## 2023-05-12 DIAGNOSIS — Z11.59 NEED FOR HEPATITIS C SCREENING TEST: ICD-10-CM

## 2023-05-12 DIAGNOSIS — Z11.4 SCREENING FOR HIV (HUMAN IMMUNODEFICIENCY VIRUS): ICD-10-CM

## 2023-05-12 DIAGNOSIS — Z13.220 SCREENING FOR LIPID DISORDERS: ICD-10-CM

## 2023-05-12 DIAGNOSIS — Z13.1 SCREENING FOR DIABETES MELLITUS: ICD-10-CM

## 2023-05-12 DIAGNOSIS — Z13.0 SCREENING FOR DEFICIENCY ANEMIA: ICD-10-CM

## 2023-05-12 DIAGNOSIS — Z13.29 SCREENING FOR THYROID DISORDER: ICD-10-CM

## 2023-05-12 DIAGNOSIS — R07.81 RIB PAIN: Primary | ICD-10-CM

## 2023-05-12 RX ORDER — NAPROXEN 500 MG/1
500 TABLET ORAL 2 TIMES DAILY WITH MEALS
Qty: 10 TABLET | Refills: 0 | Status: SHIPPED | OUTPATIENT
Start: 2023-05-12 | End: 2023-05-17

## 2023-05-12 NOTE — PROGRESS NOTES
Assessment/Plan:    1  Rib pain  -     naproxen (Naprosyn) 500 mg tablet; Take 1 tablet (500 mg total) by mouth 2 (two) times a day with meals for 5 days    2  Screening for deficiency anemia  -     CBC and differential; Future    3  Screening for diabetes mellitus  -     Comprehensive metabolic panel; Future    4  Screening for thyroid disorder  -     TSH, 3rd generation; Future    5  Screening for lipid disorders  -     Lipid panel; Future    6  Need for hepatitis C screening test  -     Hepatitis C Ab W/Refl To HCV RNA, Qn, PCR ([facility]); Future    7  Screening for HIV (human immunodeficiency virus)  -     HIV 1/2 Antigen/Antibody (Fourth Generation) with Reflex Testing ([facility]); Future            Return for Annual physical     Subjective:      Patient ID: Quintin Malik is a 20 y o  male  Chief Complaint   Patient presents with   • cramp in bottom of left rib cage x 2 weeks       Joe is a 20year old male who presents to the office for evaluation and management of left sided rib pain  Reports the pain began about 2 weeks ago  States that prior to the pain, he did have an acute cough for a week or so  Denies any preceding injury and denies any heavy lifting, pushing or pulling  The following portions of the patient's history were reviewed and updated as appropriate: allergies, current medications, past family history, past medical history, past social history, past surgical history and problem list     Review of Systems   Constitutional: Negative for chills and fever  Respiratory: Negative for cough, chest tightness and shortness of breath  Cardiovascular: Positive for chest pain (left sided rib pain)  Gastrointestinal: Negative for abdominal pain and nausea           Current Outpatient Medications   Medication Sig Dispense Refill   • naproxen (Naprosyn) 500 mg tablet Take 1 tablet (500 mg total) by mouth 2 (two) times a day with meals for 5 days 10 tablet 0     No current "facility-administered medications for this visit  Objective:    /64   Pulse 92   Temp 97 6 °F (36 4 °C) (Temporal)   Resp 16   Ht 5' 8\" (1 727 m)   Wt 57 2 kg (126 lb)   SpO2 98%   BMI 19 16 kg/m²        Physical Exam  Vitals reviewed  Constitutional:       General: He is not in acute distress  Appearance: Normal appearance  He is well-developed  He is not diaphoretic  HENT:      Head: Normocephalic and atraumatic  Eyes:      General:         Right eye: No discharge  Left eye: No discharge  Conjunctiva/sclera: Conjunctivae normal    Cardiovascular:      Rate and Rhythm: Normal rate and regular rhythm  Heart sounds: Normal heart sounds  Pulmonary:      Effort: Pulmonary effort is normal  No respiratory distress  Breath sounds: Normal breath sounds  No decreased breath sounds, wheezing, rhonchi or rales  Abdominal:       Skin:     General: Skin is warm and dry  Findings: No rash  Neurological:      Mental Status: He is alert and oriented to person, place, and time  Psychiatric:         Behavior: Behavior normal          Thought Content:  Thought content normal          Judgment: Judgment normal                 HANNAH Godfrey  "

## 2023-06-05 ENCOUNTER — OFFICE VISIT (OUTPATIENT)
Dept: URGENT CARE | Facility: CLINIC | Age: 23
End: 2023-06-05
Payer: COMMERCIAL

## 2023-06-05 VITALS
SYSTOLIC BLOOD PRESSURE: 130 MMHG | BODY MASS INDEX: 19.7 KG/M2 | WEIGHT: 130 LBS | TEMPERATURE: 98.4 F | RESPIRATION RATE: 16 BRPM | HEART RATE: 88 BPM | HEIGHT: 68 IN | OXYGEN SATURATION: 99 % | DIASTOLIC BLOOD PRESSURE: 80 MMHG

## 2023-06-05 DIAGNOSIS — R10.32 LLQ ABDOMINAL PAIN: Primary | ICD-10-CM

## 2023-06-05 PROCEDURE — 99213 OFFICE O/P EST LOW 20 MIN: CPT | Performed by: FAMILY MEDICINE

## 2023-06-05 NOTE — PROGRESS NOTES
[address]        NAME: Magdalena Gaytan is a 20 y o  male  : 2000    MRN: [id number]  DATE: 2023  TIME: 3:56 PM    Assessment and Plan   LLQ abdominal pain [R10 32]  1  LLQ abdominal pain              Patient Instructions     Patient Instructions   There are no signs of an acute abdomen present on exam at this time  Symptoms may be related to an abdominal muscle strain  Patient has been advised to rest and closely monitor the symptoms at this time  Patient may continue to eat and drink as tolerated  May take Tylenol as needed for pain and apply a heating pad to the site  Patient is to follow up w/ his PCP office for re-check within 24 hours  If symptoms acutely worsen or any new symptoms present, patient is to be seen in the ER  Follow up with PCP in 1 day  Proceed to  ER if symptoms worsen  Chief Complaint     Chief Complaint   Patient presents with   • Abdominal Pain     Left sided abdominal pain started today w/o any NVD  History of Present Illness       21 yo male, presents c/o LLQ abdominal pain that began today, about 1 5 hour ago  Patient states he was at work, sitting at his desk when the pain began  He denies any injuries to the site  No swelling, redness, bruising, or rashes of the area  The pain does not radiate  He describes the pain as sharp and is made worse with certain movements or body positions  No back/flank pain  No nausea/vomiting or diarrhea  No feelings of constipation, he states his last BM was this morning and was of typical consistency  He denies seeing any blood in the stool  He has a good appetite and is tolerating oral intake  No fever/chills  No headache or body aches  No groin/pelvic pain  No testicular pain or swelling  No penile pain or discharge  No dysuria or any other UTI symptoms  No hematuria  No hernia symptoms  No chest pain or SOB  No new foods or medications  He denies any history of known pre-existing GI conditions   He "has no known allergies  He has not attempted any treatment for the symptoms  No periumbilical or RLQ pain  Review of Systems   Review of Systems   Constitutional: Negative  Respiratory: Negative  Cardiovascular: Negative  Gastrointestinal:        As noted in HPI   Genitourinary: Negative  Musculoskeletal:        As noted in HPI   Skin: Negative  Allergic/Immunologic: Negative  Neurological: Negative  Hematological: Negative  Psychiatric/Behavioral: Negative  Current Medications       Current Outpatient Medications:   •  naproxen (Naprosyn) 500 mg tablet, Take 1 tablet (500 mg total) by mouth 2 (two) times a day with meals for 5 days, Disp: 10 tablet, Rfl: 0    Current Allergies     Allergies as of 06/05/2023   • (No Known Allergies)            The following portions of the patient's history were reviewed and updated as appropriate: allergies, current medications, past family history, past medical history, past social history, past surgical history and problem list      Past Medical History:   Diagnosis Date   • Alopecia    • Contact dermatitis        Past Surgical History:   Procedure Laterality Date   • WISDOM TOOTH EXTRACTION         Family History   Problem Relation Age of Onset   • Diabetes Mother    • Hypertension Mother    • No Known Problems Father    • Substance Abuse Neg Hx    • Mental illness Neg Hx          Medications have been verified  Objective   /80   Pulse 88   Temp 98 4 °F (36 9 °C)   Resp 16   Ht 5' 8\" (1 727 m)   Wt 59 kg (130 lb)   SpO2 99%   BMI 19 77 kg/m²   No LMP for male patient  Physical Exam     Physical Exam  Vitals and nursing note reviewed  Constitutional:       General: He is awake  He is not in acute distress  Appearance: Normal appearance  He is well-developed and well-groomed  He is not ill-appearing, toxic-appearing or diaphoretic  Cardiovascular:      Rate and Rhythm: Normal rate and regular rhythm        " Pulses: Normal pulses  Heart sounds: Normal heart sounds  Pulmonary:      Effort: Pulmonary effort is normal  No tachypnea, accessory muscle usage or respiratory distress  Breath sounds: Normal breath sounds and air entry  Abdominal:      General: Abdomen is flat  Bowel sounds are normal  There is no distension or abdominal bruit  There are no signs of injury  Palpations: Abdomen is soft  There is no shifting dullness, fluid wave, hepatomegaly, splenomegaly, mass or pulsatile mass  Tenderness: There is abdominal tenderness in the left lower quadrant  There is no right CVA tenderness, left CVA tenderness, guarding or rebound  Negative signs include Chambers's sign, Rovsing's sign, McBurney's sign, psoas sign and obturator sign  Hernia: No hernia is present  Comments: Mild tenderness to palpation in the LLQ  Patient experiences pain in the LLQ when going from supine to sitting position and with twisting motion of the torso  Skin:     General: Skin is warm and dry  Capillary Refill: Capillary refill takes less than 2 seconds  Coloration: Skin is not jaundiced or pale  Findings: No bruising, ecchymosis, erythema, signs of injury, rash or wound  Neurological:      Mental Status: He is alert and oriented to person, place, and time  Mental status is at baseline  Psychiatric:         Mood and Affect: Mood normal          Behavior: Behavior normal  Behavior is cooperative  Thought Content:  Thought content normal          Judgment: Judgment normal

## 2023-06-05 NOTE — PATIENT INSTRUCTIONS
There are no signs of an acute abdomen present on exam at this time  Symptoms may be related to an abdominal muscle strain  Patient has been advised to rest and closely monitor the symptoms at this time  Patient may continue to eat and drink as tolerated  May take Tylenol as needed for pain and apply a heating pad to the site  Patient is to follow up w/ his PCP office for re-check within 24 hours  If symptoms acutely worsen or any new symptoms present, patient is to be seen in the ER

## 2023-06-06 ENCOUNTER — OFFICE VISIT (OUTPATIENT)
Dept: FAMILY MEDICINE CLINIC | Facility: CLINIC | Age: 23
End: 2023-06-06
Payer: COMMERCIAL

## 2023-06-06 VITALS
HEIGHT: 68 IN | SYSTOLIC BLOOD PRESSURE: 108 MMHG | TEMPERATURE: 97.4 F | OXYGEN SATURATION: 97 % | DIASTOLIC BLOOD PRESSURE: 62 MMHG | WEIGHT: 130 LBS | HEART RATE: 100 BPM | RESPIRATION RATE: 12 BRPM | BODY MASS INDEX: 19.7 KG/M2

## 2023-06-06 DIAGNOSIS — R10.9 LEFT SIDED ABDOMINAL PAIN: Primary | ICD-10-CM

## 2023-06-06 PROCEDURE — 99213 OFFICE O/P EST LOW 20 MIN: CPT | Performed by: NURSE PRACTITIONER

## 2023-06-06 NOTE — PROGRESS NOTES
"Assessment/Plan:    1  Left sided abdominal pain  Comments:  Suspect MSK in origin  Encourage anti-inflammatory therapy with Aleve, take as directed and with food BID x's 5 days  Monitor for improvement  Return if symptoms worsen or fail to improve  Subjective:      Patient ID: Kiana Christian is a 20 y o  male  Chief Complaint   Patient presents with   • Follow-up     Pt here for Urgent Care Follow up, pain in left side of abdomen  Went to UC yesterday  They did not find anything  Abdominal Pain  This is a new problem  The current episode started in the past 7 days  The onset quality is sudden  The problem has been gradually improving  The pain is located in the LLQ  The pain is mild  The quality of the pain is sharp (\"stabbing\")  The abdominal pain does not radiate  Pertinent negatives include no constipation, diarrhea, dysuria, fever, frequency or nausea  Nothing aggravates the pain  The pain is relieved by nothing  The following portions of the patient's history were reviewed and updated as appropriate: allergies, current medications, past family history, past medical history, past social history, past surgical history and problem list     Review of Systems   Constitutional: Negative for chills, fatigue and fever  Respiratory: Negative for cough, chest tightness and shortness of breath  Cardiovascular: Negative for chest pain  Gastrointestinal: Positive for abdominal pain  Negative for abdominal distention, constipation, diarrhea and nausea  Genitourinary: Negative for dysuria and frequency  Current Outpatient Medications   Medication Sig Dispense Refill   • naproxen (Naprosyn) 500 mg tablet Take 1 tablet (500 mg total) by mouth 2 (two) times a day with meals for 5 days 10 tablet 0     No current facility-administered medications for this visit         Objective:    /62 (BP Location: Left arm, Patient Position: Sitting, Cuff Size: Large)   Pulse 100   Temp (!) " "97 4 °F (36 3 °C) (Temporal)   Resp 12   Ht 5' 8\" (1 727 m)   Wt 59 kg (130 lb)   SpO2 97%   BMI 19 77 kg/m²        Physical Exam  Vitals reviewed  Constitutional:       Appearance: Normal appearance  HENT:      Head: Normocephalic and atraumatic  Cardiovascular:      Rate and Rhythm: Normal rate and regular rhythm  Pulses: Normal pulses  Heart sounds: Normal heart sounds  Pulmonary:      Effort: Pulmonary effort is normal       Breath sounds: Normal breath sounds  Abdominal:      General: Bowel sounds are normal       Palpations: Abdomen is soft  There is no hepatomegaly or splenomegaly  Tenderness: There is abdominal tenderness in the left upper quadrant  Neurological:      Mental Status: He is alert and oriented to person, place, and time     Psychiatric:         Mood and Affect: Mood normal                 Casimiro HANNAH Cintron  "

## 2023-08-01 ENCOUNTER — OFFICE VISIT (OUTPATIENT)
Dept: FAMILY MEDICINE CLINIC | Facility: CLINIC | Age: 23
End: 2023-08-01
Payer: COMMERCIAL

## 2023-08-01 VITALS
HEART RATE: 98 BPM | OXYGEN SATURATION: 98 % | DIASTOLIC BLOOD PRESSURE: 68 MMHG | TEMPERATURE: 97.2 F | BODY MASS INDEX: 19.7 KG/M2 | SYSTOLIC BLOOD PRESSURE: 110 MMHG | RESPIRATION RATE: 12 BRPM | HEIGHT: 68 IN | WEIGHT: 130 LBS

## 2023-08-01 DIAGNOSIS — Z00.00 ENCOUNTER FOR ANNUAL GENERAL MEDICAL EXAMINATION WITHOUT ABNORMAL FINDINGS IN ADULT: Primary | ICD-10-CM

## 2023-08-01 DIAGNOSIS — F41.9 ANXIETY: ICD-10-CM

## 2023-08-01 DIAGNOSIS — R00.0 HEART RATE FAST: ICD-10-CM

## 2023-08-01 PROCEDURE — 99395 PREV VISIT EST AGE 18-39: CPT | Performed by: NURSE PRACTITIONER

## 2023-08-01 PROCEDURE — 93000 ELECTROCARDIOGRAM COMPLETE: CPT | Performed by: NURSE PRACTITIONER

## 2023-08-01 NOTE — ASSESSMENT & PLAN NOTE
Discussed self care and healthy coping. Pt reports that he has healthy coping strategies in place for management of anxiety.

## 2023-08-01 NOTE — PROGRESS NOTES
FAMILY PRACTICE HEALTH MAINTENANCE OFFICE VISIT  [facility] -  [address] [facility]    NAME: Shanique Scott  AGE: 20 y.o. SEX: male  : 2000     DATE: 2023    Assessment and Plan     1. Encounter for annual general medical examination without abnormal findings in adult  Comments:  Age appropriate screenings and recommendations discussed. 2. Heart rate fast  Comments:  HR wnl in office today. Discussed anxiety, reduce vaping. Provided reassurance. Orders:  -     POCT ECG    3. Anxiety  Assessment & Plan:  Discussed self care and healthy coping. Pt reports that he has healthy coping strategies in place for management of anxiety. Orders:  -     POCT ECG      · Patient Counseling:   · Nutrition: Stressed importance of a well balanced diet, moderation of sodium/saturated fat, caloric balance and sufficient intake of fiber  · Exercise: Stressed the importance of regular exercise with a goal of 150 minutes per week  · Dental Health: Discussed daily flossing and brushing and regular dental visits   · Sexuality: Discussed sexually transmitted infections, use of condoms and prevention of unintended pregnancy  · Alcohol Use:  Recommended moderation of alcohol intake  · Injury Prevention: Discussed Safety Belts, Safety Helmets, and Smoke Detectors    · Immunizations reviewed: Risks and Benefits discussed, Declined recommended vaccinations and pt states he will return to for recommended vaccinations  · Discussed benefits of:  Screening labs. • BMI Counseling: Body mass index is 19.77 kg/m². Discussed with patient's BMI with him. Return for nurse visit for hpv and tdap.         Chief Complaint     Chief Complaint   Patient presents with   • Annual Exam       History of Present Illness     HPI    Well Adult Physical   Patient here for a comprehensive physical exam.      Diet and Physical Activity  Diet: well balanced diet  Exercise: intermittently      Depression Screen  PHQ-2/9 Depression Screening    Little interest or pleasure in doing things: 0 - not at all  Feeling down, depressed, or hopeless: 0 - not at all  PHQ-2 Score: 0  PHQ-2 Interpretation: Negative depression screen          General Health  Hearing: Normal:  bilateral  Vision: no vision problems and goes for regular eye exams  Dental: regular dental visits    Reproductive Health  No issues       The following portions of the patient's history were reviewed and updated as appropriate: allergies, current medications, past family history, past medical history, past social history, past surgical history and problem list.    Review of Systems     Review of Systems   Constitutional: Negative for diaphoresis, fatigue and fever. HENT: Negative for ear pain and hearing loss. Eyes: Negative for pain and visual disturbance. Respiratory: Positive for chest tightness (intermittent). Negative for shortness of breath. Cardiovascular: Negative for chest pain, palpitations and leg swelling. Feels his heart rate is fast, denies palpitations   Gastrointestinal: Negative for abdominal pain, constipation and diarrhea. Genitourinary: Negative for difficulty urinating. Musculoskeletal: Negative for arthralgias and myalgias. Skin: Negative for rash. Neurological: Negative for dizziness, numbness and headaches. Psychiatric/Behavioral: Negative for sleep disturbance. The patient is nervous/anxious (chronic, intermittent).         Past Medical History     Past Medical History:   Diagnosis Date   • Alopecia    • Contact dermatitis        Past Surgical History     Past Surgical History:   Procedure Laterality Date   • WISDOM TOOTH EXTRACTION         Social History     Social History     Socioeconomic History   • Marital status: Single     Spouse name: None   • Number of children: None   • Years of education: None   • Highest education level: None   Occupational History   • None   Tobacco Use   • Smoking status: Never   • Smokeless tobacco: Never   • Tobacco comments:     vape   Vaping Use   • Vaping Use: Every day   • Substances: Nicotine, THC   Substance and Sexual Activity   • Alcohol use: Yes   • Drug use: Yes     Types: Marijuana   • Sexual activity: None   Other Topics Concern   • None   Social History Narrative    Caffeine use- cola     Social Determinants of Health     Financial Resource Strain: Not on file   Food Insecurity: Not on file   Transportation Needs: Not on file   Physical Activity: Not on file   Stress: Not on file   Social Connections: Not on file   Intimate Partner Violence: Not on file   Housing Stability: Not on file       Family History     Family History   Problem Relation Age of Onset   • Diabetes Mother    • Hypertension Mother    • No Known Problems Father    • Substance Abuse Neg Hx    • Mental illness Neg Hx        Current Medications     No current outpatient medications on file. Allergies     No Known Allergies    Objective     /68 (BP Location: Left arm, Patient Position: Sitting, Cuff Size: Standard)   Pulse 98   Temp (!) 97.2 °F (36.2 °C) (Temporal)   Resp 12   Ht 5' 8" (1.727 m)   Wt 59 kg (130 lb)   SpO2 98%   BMI 19.77 kg/m²      Physical Exam  Vitals reviewed. Constitutional:       General: He is not in acute distress. Appearance: Normal appearance. He is well-developed. He is not diaphoretic. HENT:      Head: Normocephalic and atraumatic. Right Ear: Tympanic membrane, ear canal and external ear normal.      Left Ear: Tympanic membrane, ear canal and external ear normal.   Eyes:      General: Lids are normal.      Extraocular Movements: Extraocular movements intact. Conjunctiva/sclera: Conjunctivae normal.      Pupils: Pupils are equal, round, and reactive to light. Pupils are equal.      Funduscopic exam:     Right eye: No hemorrhage. Red reflex present. Left eye: No hemorrhage. Red reflex present. Neck:      Thyroid: No thyroid mass or thyromegaly. Vascular: No carotid bruit. Cardiovascular:      Rate and Rhythm: Normal rate and regular rhythm. Pulses: Normal pulses. Heart sounds: Normal heart sounds, S1 normal and S2 normal. No murmur heard. Pulmonary:      Effort: Pulmonary effort is normal.      Breath sounds: Normal breath sounds. No decreased breath sounds, wheezing, rhonchi or rales. Abdominal:      General: Bowel sounds are normal. There is no distension. Palpations: Abdomen is soft. There is no hepatomegaly or splenomegaly. Tenderness: There is no abdominal tenderness. Musculoskeletal:         General: No tenderness or deformity. Normal range of motion. Cervical back: Full passive range of motion without pain, normal range of motion and neck supple. Right lower leg: No edema. Left lower leg: No edema. Lymphadenopathy:      Cervical: No cervical adenopathy. Upper Body:      Right upper body: No supraclavicular adenopathy. Left upper body: No supraclavicular adenopathy. Skin:     General: Skin is warm and dry. Findings: No rash. Neurological:      General: No focal deficit present. Mental Status: He is alert and oriented to person, place, and time. Cranial Nerves: No cranial nerve deficit. Motor: Motor function is intact. Coordination: Coordination normal.      Deep Tendon Reflexes: Reflexes are normal and symmetric. Psychiatric:         Speech: Speech normal.         Behavior: Behavior normal.         Thought Content:  Thought content normal.         Judgment: Judgment normal.                 Nelli Abreu, [id number] [address]

## 2023-08-14 ENCOUNTER — CLINICAL SUPPORT (OUTPATIENT)
Dept: FAMILY MEDICINE CLINIC | Facility: CLINIC | Age: 23
End: 2023-08-14
Payer: COMMERCIAL

## 2023-08-14 DIAGNOSIS — Z23 NEED FOR HPV VACCINATION: ICD-10-CM

## 2023-08-14 DIAGNOSIS — Z23 NEED FOR TDAP VACCINATION: Primary | ICD-10-CM

## 2023-08-14 PROCEDURE — 90471 IMMUNIZATION ADMIN: CPT

## 2023-08-14 PROCEDURE — 90651 9VHPV VACCINE 2/3 DOSE IM: CPT

## 2023-08-14 PROCEDURE — 90472 IMMUNIZATION ADMIN EACH ADD: CPT

## 2023-08-14 PROCEDURE — 90715 TDAP VACCINE 7 YRS/> IM: CPT

## 2023-08-25 ENCOUNTER — OFFICE VISIT (OUTPATIENT)
Dept: FAMILY MEDICINE CLINIC | Facility: CLINIC | Age: 23
End: 2023-08-25
Payer: COMMERCIAL

## 2023-08-25 VITALS
OXYGEN SATURATION: 96 % | WEIGHT: 130 LBS | HEIGHT: 68 IN | HEART RATE: 72 BPM | RESPIRATION RATE: 12 BRPM | TEMPERATURE: 97.1 F | BODY MASS INDEX: 19.7 KG/M2 | DIASTOLIC BLOOD PRESSURE: 70 MMHG | SYSTOLIC BLOOD PRESSURE: 100 MMHG

## 2023-08-25 DIAGNOSIS — J02.9 VIRAL PHARYNGITIS: Primary | ICD-10-CM

## 2023-08-25 DIAGNOSIS — J02.9 SORE THROAT: ICD-10-CM

## 2023-08-25 LAB — S PYO AG THROAT QL: NEGATIVE

## 2023-08-25 PROCEDURE — 99213 OFFICE O/P EST LOW 20 MIN: CPT | Performed by: NURSE PRACTITIONER

## 2023-08-25 PROCEDURE — 87880 STREP A ASSAY W/OPTIC: CPT | Performed by: NURSE PRACTITIONER

## 2023-08-25 NOTE — PROGRESS NOTES
Assessment/Plan:    1. Viral pharyngitis  Comments:  Enourage supportive care with fluids and rest. Symptoms gradually improving, per pt. 2. Sore throat  -     POCT rapid strepA  -     Throat culture            Return if symptoms worsen or fail to improve. Subjective:      Patient ID: Ibeth Mclaughlin is a 20 y.o. male. Chief Complaint   Patient presents with   • Sore Throat     Pt here for sore throat and swollen glands since Sunday   • COVID-19       Sore Throat   This is a new problem. The current episode started in the past 7 days. The problem has been unchanged. There has been no fever. The pain is moderate. Associated symptoms include headaches and swollen glands. Pertinent negatives include no abdominal pain, congestion, coughing, diarrhea, ear discharge, ear pain, plugged ear sensation, neck pain, shortness of breath or vomiting. He has tried nothing for the symptoms. The treatment provided no relief. The following portions of the patient's history were reviewed and updated as appropriate: allergies, current medications, past family history, past medical history, past social history, past surgical history and problem list.    Review of Systems   Constitutional: Negative for chills, diaphoresis, fatigue and fever. HENT: Positive for sore throat. Negative for congestion, ear discharge, ear pain, postnasal drip, rhinorrhea, sinus pressure and sinus pain. Eyes: Negative for pain and discharge. Respiratory: Negative for cough, chest tightness, shortness of breath and wheezing. Cardiovascular: Negative for chest pain. Gastrointestinal: Negative for abdominal pain, diarrhea, nausea and vomiting. Musculoskeletal: Negative for myalgias and neck pain. Skin: Negative for rash. Neurological: Positive for headaches. Negative for dizziness. Hematological: Negative for adenopathy. No current outpatient medications on file.      No current facility-administered medications for this visit. Objective:    /70 (BP Location: Left arm, Patient Position: Sitting, Cuff Size: Standard)   Pulse 72   Temp (!) 97.1 °F (36.2 °C) (Temporal)   Resp 12   Ht 5' 8" (1.727 m)   Wt 59 kg (130 lb)   SpO2 96%   BMI 19.77 kg/m²        Physical Exam  Vitals reviewed. Constitutional:       General: He is not in acute distress. Appearance: He is well-developed. He is not diaphoretic. HENT:      Head: Normocephalic and atraumatic. Right Ear: Tympanic membrane, ear canal and external ear normal. No drainage, swelling or tenderness. No middle ear effusion. Left Ear: Tympanic membrane, ear canal and external ear normal. No drainage, swelling or tenderness. No middle ear effusion. Nose: Rhinorrhea present. No mucosal edema. Rhinorrhea is clear. Right Sinus: No maxillary sinus tenderness or frontal sinus tenderness. Left Sinus: No maxillary sinus tenderness or frontal sinus tenderness. Mouth/Throat:      Pharynx: Uvula midline. Posterior oropharyngeal erythema present. No oropharyngeal exudate. Eyes:      General:         Right eye: No discharge. Left eye: No discharge. Conjunctiva/sclera: Conjunctivae normal.   Neck:      Thyroid: No thyromegaly. Cardiovascular:      Rate and Rhythm: Normal rate and regular rhythm. Heart sounds: Normal heart sounds, S1 normal and S2 normal.   Pulmonary:      Effort: Pulmonary effort is normal. No respiratory distress. Breath sounds: Normal breath sounds. No decreased breath sounds, wheezing, rhonchi or rales. Abdominal:      General: Bowel sounds are normal. There is no distension. Palpations: Abdomen is soft. Tenderness: There is no abdominal tenderness. There is no rebound. Musculoskeletal:      Cervical back: Normal range of motion and neck supple. Lymphadenopathy:      Cervical: No cervical adenopathy. Skin:     General: Skin is warm and dry. Findings: No rash.    Neurological: Mental Status: He is alert and oriented to person, place, and time. Psychiatric:         Behavior: Behavior normal.         Thought Content:  Thought content normal.                HANNAH Bolaños

## 2024-04-23 ENCOUNTER — OFFICE VISIT (OUTPATIENT)
Dept: FAMILY MEDICINE CLINIC | Facility: CLINIC | Age: 24
End: 2024-04-23
Payer: COMMERCIAL

## 2024-04-23 VITALS
RESPIRATION RATE: 18 BRPM | SYSTOLIC BLOOD PRESSURE: 114 MMHG | OXYGEN SATURATION: 97 % | WEIGHT: 136.8 LBS | HEART RATE: 82 BPM | HEIGHT: 68 IN | DIASTOLIC BLOOD PRESSURE: 70 MMHG | TEMPERATURE: 98.2 F | BODY MASS INDEX: 20.73 KG/M2

## 2024-04-23 DIAGNOSIS — R07.1 INSPIRATORY PAIN: Primary | ICD-10-CM

## 2024-04-23 PROCEDURE — 99213 OFFICE O/P EST LOW 20 MIN: CPT | Performed by: STUDENT IN AN ORGANIZED HEALTH CARE EDUCATION/TRAINING PROGRAM

## 2024-04-23 NOTE — PROGRESS NOTES
"Name: Andi Marquez      : 2000      MRN: [id number]  Encounter Provider: Haylee Perkins MD  Encounter Date: 2024   Encounter department: [facility]    Assessment & Plan     1. Inspiratory pain  -     XR chest pa & lateral; Future; Expected date: 2024  -     Comprehensive metabolic panel; Future; Expected date: 2024  -     CBC and differential; Future  -     C-reactive protein; Future  -     Comprehensive metabolic panel  -     CBC and differential  -     C-reactive protein     Pain most likely MSK in nature. Advised to take NSAIDS as needed for pain. Follow up with CXR and labs.     Subjective      HPI    Patient reports pain in rib area during inhalation for 1 1/2 weeks. He notes this happens 1-2 times a day and the pain is sharp. When the pain does occur it lasts about 1-2 minutes. He has not done anything for the pain. No lung issues. He has not been sick recently. Denies cough. Notes he has not done any heavy lifting. Denies GERD symptoms.     Review of Systems   Constitutional:  Negative for activity change, appetite change, chills, fatigue and fever.   HENT:  Negative for congestion.    Respiratory:  Negative for cough, shortness of breath and wheezing.    Cardiovascular:  Negative for chest pain, palpitations and leg swelling.   Gastrointestinal:  Negative for abdominal pain, constipation, diarrhea, nausea and vomiting.   Musculoskeletal:  Positive for myalgias.   Skin:  Negative for rash.   Neurological:  Negative for light-headedness and headaches.   Psychiatric/Behavioral:  The patient is not nervous/anxious.        No current outpatient medications on file prior to visit.       Objective     /70   Pulse 82   Temp 98.2 °F (36.8 °C) (Temporal)   Resp 18   Ht 5' 8\" (1.727 m)   Wt 62.1 kg (136 lb 12.8 oz)   SpO2 97%   BMI 20.80 kg/m²     Physical Exam  Constitutional:       Appearance: Normal appearance.   HENT:      Head: Normocephalic and " atraumatic.   Cardiovascular:      Rate and Rhythm: Normal rate and regular rhythm.      Pulses: Normal pulses.      Heart sounds: Normal heart sounds.   Pulmonary:      Effort: Pulmonary effort is normal. No respiratory distress.      Breath sounds: Normal breath sounds. No wheezing or rhonchi.   Chest:       Neurological:      General: No focal deficit present.      Mental Status: He is alert and oriented to person, place, and time.   Psychiatric:         Mood and Affect: Mood normal.         Behavior: Behavior normal.         Thought Content: Thought content normal.         Judgment: Judgment normal.       Haylee Perkins MD

## 2024-04-25 ENCOUNTER — TELEPHONE (OUTPATIENT)
Dept: FAMILY MEDICINE CLINIC | Facility: CLINIC | Age: 24
End: 2024-04-25

## 2024-04-25 DIAGNOSIS — R91.1 PULMONARY NODULE SEEN ON IMAGING STUDY: ICD-10-CM

## 2024-04-25 DIAGNOSIS — R93.89 ABNORMAL RADIOLOGIC DENSITY: Primary | ICD-10-CM

## 2024-04-25 NOTE — TELEPHONE ENCOUNTER
CT chest wo  Will schedule on 5/2  [facility] [place] [address] [place] [id number] [phone]  [facility]  [phone]  [place] [phone]

## 2024-04-30 ENCOUNTER — OFFICE VISIT (OUTPATIENT)
Dept: FAMILY MEDICINE CLINIC | Facility: CLINIC | Age: 24
End: 2024-04-30
Payer: COMMERCIAL

## 2024-04-30 VITALS
HEIGHT: 68 IN | BODY MASS INDEX: 20.61 KG/M2 | HEART RATE: 68 BPM | RESPIRATION RATE: 12 BRPM | OXYGEN SATURATION: 98 % | DIASTOLIC BLOOD PRESSURE: 62 MMHG | WEIGHT: 136 LBS | TEMPERATURE: 97.7 F | SYSTOLIC BLOOD PRESSURE: 100 MMHG

## 2024-04-30 DIAGNOSIS — R07.1 INSPIRATORY PAIN: ICD-10-CM

## 2024-04-30 DIAGNOSIS — R93.89 ABNORMAL RADIOLOGIC DENSITY: Primary | ICD-10-CM

## 2024-04-30 PROCEDURE — 99213 OFFICE O/P EST LOW 20 MIN: CPT | Performed by: STUDENT IN AN ORGANIZED HEALTH CARE EDUCATION/TRAINING PROGRAM

## 2024-04-30 NOTE — PROGRESS NOTES
"Name: Andi Marquez      : 2000      MRN: [id number]  Encounter Provider: Haylee Perkins MD  Encounter Date: 2024   Encounter department: [facility]    Assessment & Plan     1. Abnormal radiologic density    2. Inspiratory pain    -CT chest pending   -If patient develops shortness of breath or chest pain he should proceed to ER    Subjective      HPI    Patient notes that he continues to have pain on inspiration that occurs randomly. The discomfort only happens for a few seconds when it does happen. CXR showed abnormal radiologic density. He denies history of asthma or trouble breathing. Pain is mainly located near ribs on left side.       Review of Systems   Constitutional:  Negative for activity change, appetite change, chills, fatigue and fever.   HENT:  Negative for congestion.    Respiratory:  Negative for cough, shortness of breath and wheezing.    Cardiovascular:  Negative for chest pain, palpitations and leg swelling.   Gastrointestinal:  Negative for abdominal pain, constipation, diarrhea, nausea and vomiting.   Skin:  Negative for rash.   Neurological:  Negative for light-headedness and headaches.   Psychiatric/Behavioral:  The patient is not nervous/anxious.        No current outpatient medications on file prior to visit.       Objective     /62 (BP Location: Left arm, Patient Position: Sitting, Cuff Size: Large)   Pulse 68   Temp 97.7 °F (36.5 °C) (Temporal)   Resp 12   Ht 5' 8\" (1.727 m)   Wt 61.7 kg (136 lb)   SpO2 98%   BMI 20.68 kg/m²     Physical Exam  Constitutional:       Appearance: Normal appearance.   HENT:      Head: Normocephalic and atraumatic.   Cardiovascular:      Rate and Rhythm: Normal rate and regular rhythm.      Pulses: Normal pulses.      Heart sounds: Normal heart sounds.   Pulmonary:      Effort: Pulmonary effort is normal. No respiratory distress.      Breath sounds: Normal breath sounds. No wheezing or rhonchi.   Chest:      " Chest wall: No tenderness.   Neurological:      General: No focal deficit present.      Mental Status: He is alert and oriented to person, place, and time.   Psychiatric:         Mood and Affect: Mood normal.         Behavior: Behavior normal.         Thought Content: Thought content normal.         Judgment: Judgment normal.       Haylee Perkins MD

## 2024-05-08 ENCOUNTER — TELEPHONE (OUTPATIENT)
Dept: FAMILY MEDICINE CLINIC | Facility: CLINIC | Age: 24
End: 2024-05-08

## 2024-05-08 DIAGNOSIS — R07.1 INSPIRATORY PAIN: Primary | ICD-10-CM

## 2024-05-08 DIAGNOSIS — R91.1 INCIDENTAL PULMONARY NODULE, > 3MM AND < 8MM: Primary | ICD-10-CM

## 2024-05-08 NOTE — TELEPHONE ENCOUNTER
Patient returned call and message was relayed to patient.  Provided number to Central Scheduling.    Patient will call to schedule    No further action needed

## 2024-05-08 NOTE — TELEPHONE ENCOUNTER
Called patient and discussed recent CT chest results. All question answered. Patient will follow up with labs and be seen in office once labs completed.

## 2024-05-08 NOTE — TELEPHONE ENCOUNTER
Left a message relaying that  put an order in for a Complete PFT with post bronchodilator.    He can call central scheduling to schedule that  Left the phone number [phone]